# Patient Record
Sex: FEMALE | Race: BLACK OR AFRICAN AMERICAN | Employment: FULL TIME | URBAN - METROPOLITAN AREA
[De-identification: names, ages, dates, MRNs, and addresses within clinical notes are randomized per-mention and may not be internally consistent; named-entity substitution may affect disease eponyms.]

---

## 2018-12-11 ENCOUNTER — HOSPITAL ENCOUNTER (OUTPATIENT)
Age: 53
Discharge: HOME | End: 2018-12-11
Attending: EMERGENCY MEDICINE
Payer: COMMERCIAL

## 2018-12-11 VITALS
HEART RATE: 71 BPM | WEIGHT: 145 LBS | HEIGHT: 63 IN | SYSTOLIC BLOOD PRESSURE: 109 MMHG | RESPIRATION RATE: 16 BRPM | TEMPERATURE: 98 F | BODY MASS INDEX: 25.69 KG/M2 | DIASTOLIC BLOOD PRESSURE: 66 MMHG | OXYGEN SATURATION: 97 %

## 2018-12-11 DIAGNOSIS — T16.2XXA FOREIGN BODY OF LEFT EAR, INITIAL ENCOUNTER: Primary | ICD-10-CM

## 2018-12-11 PROCEDURE — 99203 OFFICE O/P NEW LOW 30 MIN: CPT

## 2018-12-11 PROCEDURE — 69209 REMOVE IMPACTED EAR WAX UNI: CPT

## 2018-12-11 NOTE — ED INITIAL ASSESSMENT (HPI)
Last night ears felt dry and patient applied baby oil in ears with a q-tip. Today, woke up with ears feeling achy. Now they feel clogged with impaired hearing in left ear. Went to [facility] and was told to come here for an assessment. Denies fevers.

## 2018-12-11 NOTE — ED PROVIDER NOTES
Patient Seen in: [facility]    History   Patient presents with:  Ear Problem Pain (neurosensory)    Stated Complaint: CLOGGED EARS SINCE SUNDAY    HPI    50-year-old female comes to the hospital complaint of having difficul otic and follow with ENT.             Disposition and Plan     Clinical Impression:  Foreign body of left ear, initial encounter  (primary encounter diagnosis)    Disposition:  Discharge  12/11/2018  5:47 pm    Follow-up:  Rosita Mon MD  [address] [place] [address]